# Patient Record
Sex: FEMALE | Race: WHITE | NOT HISPANIC OR LATINO | Employment: UNEMPLOYED | URBAN - METROPOLITAN AREA
[De-identification: names, ages, dates, MRNs, and addresses within clinical notes are randomized per-mention and may not be internally consistent; named-entity substitution may affect disease eponyms.]

---

## 2017-12-26 ENCOUNTER — HOSPITAL ENCOUNTER (EMERGENCY)
Facility: OTHER | Age: 52
Discharge: HOME | End: 2017-12-26
Attending: EMERGENCY MEDICINE
Payer: MEDICAID

## 2017-12-26 VITALS
DIASTOLIC BLOOD PRESSURE: 99 MMHG | HEART RATE: 79 BPM | RESPIRATION RATE: 17 BRPM | HEIGHT: 65 IN | BODY MASS INDEX: 25.83 KG/M2 | OXYGEN SATURATION: 100 % | SYSTOLIC BLOOD PRESSURE: 148 MMHG | TEMPERATURE: 98 F | WEIGHT: 155 LBS

## 2017-12-26 DIAGNOSIS — R68.89 FLU-LIKE SYMPTOMS: Primary | ICD-10-CM

## 2017-12-26 DIAGNOSIS — R05.9 COUGH: ICD-10-CM

## 2017-12-26 LAB
B-HCG UR QL: NEGATIVE
CTP QC/QA: YES
CTP QC/QA: YES
FLUAV AG NPH QL: NEGATIVE
FLUBV AG NPH QL: NEGATIVE

## 2017-12-26 PROCEDURE — 87804 INFLUENZA ASSAY W/OPTIC: CPT

## 2017-12-26 PROCEDURE — 99284 EMERGENCY DEPT VISIT MOD MDM: CPT | Mod: 25

## 2017-12-26 PROCEDURE — 81025 URINE PREGNANCY TEST: CPT | Performed by: EMERGENCY MEDICINE

## 2017-12-26 RX ORDER — ESCITALOPRAM OXALATE 20 MG/1
20 TABLET ORAL DAILY
COMMUNITY

## 2017-12-26 RX ORDER — PROMETHAZINE HYDROCHLORIDE AND DEXTROMETHORPHAN HYDROBROMIDE 6.25; 15 MG/5ML; MG/5ML
5 SYRUP ORAL EVERY 6 HOURS PRN
Qty: 120 ML | Refills: 0 | Status: SHIPPED | OUTPATIENT
Start: 2017-12-26 | End: 2018-01-05

## 2017-12-26 RX ORDER — LEVOTHYROXINE SODIUM 88 UG/1
88 TABLET ORAL DAILY
COMMUNITY

## 2017-12-26 RX ORDER — OSELTAMIVIR PHOSPHATE 75 MG/1
75 CAPSULE ORAL 2 TIMES DAILY
Qty: 10 CAPSULE | Refills: 0 | Status: SHIPPED | OUTPATIENT
Start: 2017-12-26 | End: 2017-12-31

## 2017-12-26 RX ORDER — NORETHINDRONE ACETATE AND ETHINYL ESTRADIOL .02; 1 MG/1; MG/1
1 TABLET ORAL DAILY
COMMUNITY

## 2017-12-26 NOTE — ED PROVIDER NOTES
Encounter Date: 12/26/2017       History     Chief Complaint   Patient presents with    Chest Congestion    Cough    Influenza     A 52-year-old female who presents to the ED with nasal congestion, cough bodyaches and chills for 3 days.  Patient states she was treated for a sinusitis a week ago with a Z-Akira.  Patient states the symptoms got better for a few days but returned.  Patient has a history of depression and hypothyroidism.      The history is provided by the patient.   URI   The primary symptoms include fever and cough. Primary symptoms do not include sore throat, nausea or rash. The current episode started several days ago. The problem has been gradually worsening. The fever began 1 to 2 days ago. The fever has been gradually improving since its onset. The temperature was taken by no thermometer. The maximum temperature recorded prior to her arrival was unknown.   The cough began 3 to 5 days ago. The cough is non-productive.   Symptoms associated with the illness include chills and congestion. The following treatments were addressed: Acetaminophen was not tried. NSAIDs were not tried.     Review of patient's allergies indicates:  No Known Allergies  Past Medical History:   Diagnosis Date    Cancer 2014    Skin cancer left nose     Depression     Depression     Hypothyroid     Thyroid disease     Uterine fibroid      Past Surgical History:   Procedure Laterality Date    mohs  April 2014    left nasal lesion     Family History   Problem Relation Age of Onset    Diabetes Mother     Hypertension Mother     Hypertension Father      Social History   Substance Use Topics    Smoking status: Never Smoker    Smokeless tobacco: Never Used    Alcohol use No     Review of Systems   Constitutional: Positive for chills and fever.   HENT: Positive for congestion. Negative for sore throat.    Respiratory: Positive for cough. Negative for shortness of breath.    Cardiovascular: Negative for chest pain.    Gastrointestinal: Negative for nausea.   Genitourinary: Negative for dysuria.   Musculoskeletal: Negative for back pain.   Skin: Negative for rash.   Neurological: Negative for weakness.   Hematological: Does not bruise/bleed easily.   All other systems reviewed and are negative.      Physical Exam     Initial Vitals [12/26/17 1356]   BP Pulse Resp Temp SpO2   -- 79 16 97.7 °F (36.5 °C) 97 %      MAP       --         Physical Exam    Nursing note and vitals reviewed.  Constitutional: Vital signs are normal. She appears well-developed. She is cooperative. She does not appear ill.   HENT:   Head: Normocephalic and atraumatic.   Right Ear: Tympanic membrane, external ear and ear canal normal.   Left Ear: Tympanic membrane, external ear and ear canal normal.   Nose: Nose normal.   Mouth/Throat: Uvula is midline, oropharynx is clear and moist and mucous membranes are normal.   Eyes: Conjunctivae and lids are normal. Pupils are equal, round, and reactive to light.   Neck: Normal range of motion. Neck supple.   Cardiovascular: Normal rate, regular rhythm, normal heart sounds and intact distal pulses.   Pulmonary/Chest: Effort normal and breath sounds normal.   Abdominal: Soft. Normal appearance and bowel sounds are normal. There is no tenderness.   Musculoskeletal: Normal range of motion.   Neurological: She is alert and oriented to person, place, and time.   Skin: Skin is warm, dry and intact. Capillary refill takes less than 2 seconds. No rash noted.   Psychiatric: She has a normal mood and affect. Her behavior is normal. Judgment and thought content normal. Cognition and memory are normal.         ED Course   Procedures  Labs Reviewed   POCT URINE PREGNANCY   POCT INFLUENZA A/B     EKG Readings: (Independently Interpreted)   Initial Reading: No STEMI. Rhythm: Normal Sinus Rhythm. Heart Rate: 67. Axis: Normal. Clinical Impression: Normal Sinus Rhythm Other Impression: normal EKG     Imaging Results          X-Ray Chest  "PA And Lateral (Final result)  Result time 12/26/17 17:51:39    Final result by Makenna Minor MD (12/26/17 17:51:39)                 Impression:     Hyperexpansion of the lungs with no other detrimental change since 7/5/09.      Electronically signed by: MAKENNA MINOR MD  Date:     12/26/17  Time:    17:51              Narrative:    XR CHEST PA AND LATERAL.  Clinical History provided for exam:" Cough". The cardiac silhouette is not enlarged.  Pulmonary vascularity is not increased.  The lungs are mildly overexpanded but free of lobar consolidation and alveolar edema.  There is no pleural effusion or pneumothorax.  There are degenerative changes of the visualized spine.  There is diffuse osseous demineralization.                             I have reviewed the notes, assessments, and/or procedures performed by NP/PA, I concur with her/his documentation of Suman Chong.  Attending:   Physician Attestation Statement: I have reviewed this case with my non-physician provider.   Physician Attestation Statement: I have provided a face to face evaluation of this patient at the request of my non-physician provider.The patient's condition warranted physician involvement. Review of Lab Data - I personally reviewed the lab data. The treatment regimen was reviewed by me. The patient's risk factors required review.   Other Attend Additions:    Discussed labs, and imaging results. Answered questions and discussed discharge plan.   Patient is to return to the Emergency department if symptoms worsen or do not resolve.         Medical Decision Making:   Initial Assessment:   A 52-year-old female who presents to the ED with complaints of nasal congestion, cough chills and aches which started 2-3 days ago.  Patient states she was recently treated with a Z-Akira for sinusitis about a week ago.  Differential Diagnosis:   Influenza  Upper respiratory infection  Acute sinusitis  Acute bronchitis  Pneumonia  Clinical Tests: "   Lab Tests: Ordered and Reviewed  Radiological Study: Ordered and Reviewed  ED Management:  Physical exam.EKG.  Influenza-negative. Chest x-ray with no acute findings.  Patient be discharged home with Tamiflu and Promethazine DM. Motrin 600 mg every 6 hours for body aches and fever.  Tylenol every 4 hours for body aches and fever.  Patient to follow-up with PCP in 2-3 days.  Return to emergency room if symptoms worsen.                   ED Course      Clinical Impression:   The primary encounter diagnosis was Flu-like symptoms. A diagnosis of Cough was also pertinent to this visit.                           AZEEM Arguello  12/26/17 2227       Candida Cary DO  12/30/17 1503

## 2019-04-03 DIAGNOSIS — R07.9 CHEST PAIN, UNSPECIFIED: Primary | ICD-10-CM

## 2019-04-17 ENCOUNTER — HOSPITAL ENCOUNTER (OUTPATIENT)
Dept: CARDIOLOGY | Facility: HOSPITAL | Age: 54
Discharge: HOME | End: 2019-04-17
Attending: INTERNAL MEDICINE
Payer: MEDICAID

## 2019-04-17 DIAGNOSIS — R07.9 CHEST PAIN, UNSPECIFIED: ICD-10-CM

## 2019-04-17 LAB
CV STRESS BASE HR: 63 BPM
DIASTOLIC BLOOD PRESSURE: 82 MMHG
OHS CV CPX 1 MINUTE RECOVERY HEART RATE: 102 BPM
OHS CV CPX 85 PERCENT MAX PREDICTED HEART RATE MALE: 137
OHS CV CPX ESTIMATED METS: 8
OHS CV CPX MAX PREDICTED HEART RATE: 161
OHS CV CPX PATIENT IS FEMALE: 1
OHS CV CPX PATIENT IS MALE: 0
OHS CV CPX PEAK DIASTOLIC BLOOD PRESSURE: 79 MMHG
OHS CV CPX PEAK HEAR RATE: 144 BPM
OHS CV CPX PEAK RATE PRESSURE PRODUCT: NORMAL
OHS CV CPX PEAK SYSTOLIC BLOOD PRESSURE: 153 MMHG
OHS CV CPX PERCENT MAX PREDICTED HEART RATE ACHIEVED: 89
OHS CV CPX PERCENT TARGET HEART RATE ACHIEVED: 85.21
OHS CV CPX RATE PRESSURE PRODUCT PRESENTING: 6993
OHS CV CPX TARGET HEART RATE: 169
STRESS ECHO POST EXERCISE DUR MIN: 6 MIN
STRESS ECHO POST EXERCISE DUR SEC: 36
SYSTOLIC BLOOD PRESSURE: 111 MMHG

## 2019-04-17 PROCEDURE — 93351 STRESS TTE COMPLETE: CPT

## 2023-10-12 NOTE — DISCHARGE INSTRUCTIONS
Tylenol 1000 mg every 4-6 hours as needed for fever.  Follow-up with PCP in 2-3 days.   Return to ED if symptoms worsens.    Mohs Histo Method Verbiage: Each section was then chromacoded and processed in the Mohs lab using the Mohs protocol and submitted for frozen section, utilizing hematoxylin and eosin histochemical stains.